# Patient Record
(demographics unavailable — no encounter records)

---

## 2025-05-03 NOTE — HISTORY OF PRESENT ILLNESS
[FreeTextEntry1] : Kaye is a 59 F presenting for evaluation for colon cancer screening. She denies any issues with nausea, vomiting, abdominal pain, diarrhea, constipation or blood in her stool. Previously on GLP but no longer taking this. no cardiopulmonary issues, blood thinner or diabetic medications.

## 2025-05-03 NOTE — ASSESSMENT
[FreeTextEntry1] : 59 F presenting for evaluation for colon cancer screening. Average risk first colonoscopy.   Plan:  -Schedule colonoscopy with betty   I, Cata Carcamo, have explained the bowel prep, clear liquid diet 24 hours prior to procedure, risks, benefits, and alternatives of the procedures.  I have discussed the potential risks including, but not limited to perforation, bleeding, infection, cardiopulmonary complications, aspiration, or unforeseen complications